# Patient Record
Sex: MALE | Race: WHITE | Employment: FULL TIME | URBAN - METROPOLITAN AREA
[De-identification: names, ages, dates, MRNs, and addresses within clinical notes are randomized per-mention and may not be internally consistent; named-entity substitution may affect disease eponyms.]

---

## 2017-01-05 ENCOUNTER — OFFICE VISIT (OUTPATIENT)
Dept: ORTHOPEDIC SURGERY | Age: 27
End: 2017-01-05

## 2017-01-05 VITALS
SYSTOLIC BLOOD PRESSURE: 146 MMHG | BODY MASS INDEX: 31.14 KG/M2 | HEIGHT: 71 IN | HEART RATE: 104 BPM | WEIGHT: 222.4 LBS | DIASTOLIC BLOOD PRESSURE: 82 MMHG | TEMPERATURE: 96.1 F

## 2017-01-05 DIAGNOSIS — M25.561 RIGHT KNEE PAIN, UNSPECIFIED CHRONICITY: ICD-10-CM

## 2017-01-05 DIAGNOSIS — M25.562 LEFT KNEE PAIN, UNSPECIFIED CHRONICITY: ICD-10-CM

## 2017-01-05 DIAGNOSIS — M25.562 CHRONIC PAIN OF BOTH KNEES: Primary | ICD-10-CM

## 2017-01-05 DIAGNOSIS — M25.561 CHRONIC PAIN OF BOTH KNEES: Primary | ICD-10-CM

## 2017-01-05 DIAGNOSIS — G89.29 CHRONIC PAIN OF BOTH KNEES: Primary | ICD-10-CM

## 2017-01-05 NOTE — PROGRESS NOTES
Jaylyn Singh  1990   Chief Complaint   Patient presents with    Knee Pain     Sean        HISTORY OF PRESENT ILLNESS  Jaylyn Singh is a 23 y.o. male who presents today for evaluation of bilateral knees. He states he has not been having any knee pain recently. 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 He is planning on working as a conductor. No Known Allergies     History reviewed. No pertinent past medical history. Social History     Social History    Marital status:      Spouse name: N/A    Number of children: N/A    Years of education: N/A     Occupational History    Not on file. Social History Main Topics    Smoking status: Never Smoker    Smokeless tobacco: Never Used    Alcohol use Yes      Comment: socially    Drug use: No    Sexual activity: Not on file     Other Topics Concern    Not on file     Social History Narrative    No narrative on file      History reviewed. No pertinent past surgical history. Family History   Problem Relation Age of Onset    Arthritis-osteo Mother       No current outpatient prescriptions on file. No current facility-administered medications for this visit. REVIEW OF SYSTEM   Patient denies: Weight loss, Fever/Chills, HA, Visual changes, Fatigue, Chest pain, SOB, Abdominal pain, N/V/D/C, Blood in stool or urine, Edema. Pertinent positive as above in HPI. All others were negative    PHYSICAL EXAM:   Visit Vitals    /82    Pulse (!) 104    Temp 96.1 °F (35.6 °C) (Oral)    Ht 5' 11\" (1.803 m)    Wt 222 lb 6.4 oz (100.9 kg)    BMI 31.02 kg/m2     The patient is a well-developed, well-nourished male   in no acute distress. The patient is alert and oriented times three. The patient is alert and oriented times three.  Mood and affect are normal.  LYMPHATIC: lymph nodes are not enlarged and are within normal limits  SKIN: normal in color and non tender to palpation. There are no bruises or abrasions noted. NEUROLOGICAL: Motor sensory exam is within normal limits. Reflexes are equal bilaterally. There is normal sensation to pinprick and light touch  MUSCULOSKELETAL:  Examination Left knee Right knee   Skin Intact Intact   Range of motion 0-130 0-130   Effusion - -   Medial joint line tenderness - -   Lateral joint line tenderness - -   Tenderness Pes Bursa - -   Tenderness insertion MCL - -   Tenderness insertion LCL - -   Angelikas - -   Patella crepitus - -   Patella grind - -   Lachman - -   Pivot shift - -   Anterior drawer - -   Posterior drawer - -   Varus stress - -   Valgus stress - -   Neurovascular Intact Intact   Calf Swelling and Tenderness to Palpation - -   Antony's Test - -   Hamstring Cord Tightness - -       IMAGIN view xray of bilateral knees was reviewed and read: no acute abnormalities. IMPRESSION:      ICD-10-CM ICD-9-CM    1. Chronic pain of both knees M25.561 719.46     M25.562 338.29     G89.29     2. Right knee pain, unspecified chronicity M25.561 719.46 AMB POC XRAY, KNEE; 1/2 VIEWS   3. Left knee pain, unspecified chronicity M25.562 719.46 AMB POC XRAY, KNEE; 1/2 VIEWS        PLAN: 1. He does not have any work restrictions, and will continue activities as tolerated. 2. He will follow up as needed.    Follow-up Disposition: Not on File    Scribed by Niraj Moseley [facility]) as dictated by MD Lucía Ames M.D.   [facility]

## 2017-01-05 NOTE — LETTER
NOTIFICATION RETURN TO WORK / SCHOOL 
 
1/5/2017 10:51 AM 
 
Mr. Raven Molina [address] [facility] [address] [id number] To Whom It May Concern: 
 
Raven Molina is currently under the care of [address] [place] [address] Terrell Nix. He was seen today for an evaluation of his knees. He is cleared to go back to work with no restrictions. If there are questions or concerns please have the patient contact our office. Sincerely, Rena Mcintyre MD

## 2017-01-06 ENCOUNTER — TELEPHONE (OUTPATIENT)
Dept: ORTHOPEDIC SURGERY | Age: 27
End: 2017-01-06

## 2017-01-06 NOTE — TELEPHONE ENCOUNTER
Patient needs a copy of his work note signed by [employer]. It must be hand signed, they will not accept a stamp. Please call patient when this is available.